# Patient Record
Sex: FEMALE | Race: NATIVE HAWAIIAN OR OTHER PACIFIC ISLANDER
[De-identification: names, ages, dates, MRNs, and addresses within clinical notes are randomized per-mention and may not be internally consistent; named-entity substitution may affect disease eponyms.]

---

## 2018-06-23 ENCOUNTER — HOSPITAL ENCOUNTER (EMERGENCY)
Dept: HOSPITAL 42 - ED | Age: 78
LOS: 1 days | Discharge: HOME | End: 2018-06-24
Payer: MEDICARE

## 2018-06-23 VITALS — TEMPERATURE: 98.3 F

## 2018-06-23 DIAGNOSIS — I10: ICD-10-CM

## 2018-06-23 DIAGNOSIS — S09.90XA: Primary | ICD-10-CM
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
## 2018-06-23 LAB
ALBUMIN SERPL-MCNC: 4.5 G/DL (ref 3–4.8)
ALBUMIN/GLOB SERPL: 1.4 {RATIO} (ref 1.1–1.8)
ALT SERPL-CCNC: 22 U/L (ref 7–56)
APTT BLD: 30.6 SECONDS (ref 25.1–36.5)
AST SERPL-CCNC: 25 U/L (ref 14–36)
BASOPHILS # BLD AUTO: 0.04 K/MM3 (ref 0–2)
BASOPHILS NFR BLD: 0.4 % (ref 0–3)
BUN SERPL-MCNC: 14 MG/DL (ref 7–21)
CALCIUM SERPL-MCNC: 9.2 MG/DL (ref 8.4–10.5)
EOSINOPHIL # BLD: 0.2 10*3/UL (ref 0–0.7)
EOSINOPHIL NFR BLD: 1.9 % (ref 1.5–5)
ERYTHROCYTE [DISTWIDTH] IN BLOOD BY AUTOMATED COUNT: 13.3 % (ref 11.5–14.5)
GFR NON-AFRICAN AMERICAN: > 60
GRANULOCYTES # BLD: 6.47 10*3/UL (ref 1.4–6.5)
GRANULOCYTES NFR BLD: 67 % (ref 50–68)
HGB BLD-MCNC: 13.6 G/DL (ref 12–16)
INR PPP: 0.9 (ref 0.93–1.08)
LYMPHOCYTES # BLD: 2.3 10*3/UL (ref 1.2–3.4)
LYMPHOCYTES NFR BLD AUTO: 23.4 % (ref 22–35)
MCH RBC QN AUTO: 32.5 PG (ref 25–35)
MCHC RBC AUTO-ENTMCNC: 33.8 G/DL (ref 31–37)
MCV RBC AUTO: 95.9 FL (ref 80–105)
MONOCYTES # BLD AUTO: 0.7 10*3/UL (ref 0.1–0.6)
MONOCYTES NFR BLD: 7.3 % (ref 1–6)
PLATELET # BLD: 279 10^3/UL (ref 120–450)
PMV BLD AUTO: 9.8 FL (ref 7–11)
PROTHROMBIN TIME: 10.2 SECONDS (ref 9.4–12.5)
RBC # BLD AUTO: 4.19 10^6/UL (ref 3.5–6.1)
TROPONIN I SERPL-MCNC: < 0.01 NG/ML
WBC # BLD AUTO: 9.7 10^3/UL (ref 4.5–11)

## 2018-06-24 VITALS
OXYGEN SATURATION: 99 % | SYSTOLIC BLOOD PRESSURE: 145 MMHG | DIASTOLIC BLOOD PRESSURE: 70 MMHG | HEART RATE: 72 BPM | RESPIRATION RATE: 18 BRPM

## 2018-06-24 NOTE — CARD
--------------- APPROVED REPORT --------------





EKG Measurement

Heart [id number]

[id number]

[id number]

[id number]

[id number]



<Conclusion>

Normal sinus rhythm

Normal ECG

## 2018-06-24 NOTE — RAD
HISTORY:

syncope  



COMPARISON:

No prior. 



FINDINGS:



LUNGS:

Poor inspiration with low lung volumes, crowded bronchovascular 

markings and mild bibasilar atelectasis



PLEURA:

No significant pleural effusion identified, no pneumothorax apparent.



CARDIOVASCULAR:

.  Cardiomegaly.



OSSEOUS STRUCTURES:

Mild multilevel degenerative spondylosis of the thoracic spine.  

There is a mild levosoliosis dextroscoliosis as well.



VISUALIZED UPPER ABDOMEN:

Normal.



OTHER FINDINGS:

None.



IMPRESSION:

Poor inspiration with low lung volumes, crowded bronchovascular 

markings and mild bibasilar atelectasis

## 2018-06-24 NOTE — ED PDOC
Arrival/HPI





- General


Chief Complaint: Trauma


Time Seen by Provider: 06/23/18 22:11


Historian: Patient





- History of Present Illness


Narrative History of Present Illness (Text): 


06/24/18 00:52


78 year old female, with no significant past medical history, presents to the 

emergency department for evaluation s/p fall. Patient notes she slipped in the 

bath tub and hit her head. Patient's daughter states the patient fell several 

days ago. Patient denies any fever, chills, chest pain, shortness of breath, 

nausea, vomiting, diarrhea, back pain, neck pain, headache, dizziness, or any 

other complaints. 


Time/Duration: Prior to Arrival


Symptom Onset: Sudden


Activities at Onset: Light


Context: Home





Past Medical History





- Provider Review


Nursing Documentation Reviewed: Yes





- Infectious Disease


Hx of Infectious Diseases: None





- Reproductive


Menopause: Yes





- Cardiac


Hx Hypertension: Yes





- Neurological


Hx Dizziness: Yes





- Musculoskeletal/Rheumatological


Hx Arthritis: No





- Psychiatric


Hx Substance Use: No





- Surgical History


Other/Comment: cataract left eye





- Anesthesia


Hx Anesthesia: Yes


Hx Anesthesia Reactions: No


Hx Malignant Hyperthermia: No





Family/Social History





- Physician Review


Nursing Documentation Reviewed: Yes


Family/Social History: Unknown Family HX


Smoking Status: Never Smoked


Hx Alcohol Use: No


Hx Substance Use: No





Allergies/Home Meds


Allergies/Adverse Reactions: 


Allergies





No Known Allergies Allergy (Verified 06/23/18 22:06)


 








Home Medications: 


 Home Meds











 Medication  Instructions  Recorded  Confirmed


 


Amlodipine Besylate/Benazepril 1 each PO DAILY 06/23/18 06/23/18





[Amlodipine-Benazepril 10-20 mg]   


 


Atenolol [Tenormin] 25 mg PO DAILY 06/23/18 06/23/18


 


Atorvastatin [Lipitor] 20 mg PO DAILY 06/23/18 06/23/18














Review of Systems





- Physician Review


All systems were reviewed & negative as marked: Yes





- Review of Systems


Constitutional: Normal


Eyes: Normal


ENT: Normal


Respiratory: Normal.  absent: SOB, Cough


Cardiovascular: Normal.  absent: Chest Pain


Gastrointestinal: Normal.  absent: Abdominal Pain


Genitourinary Female: Normal.  absent: Dysuria, Frequency


Musculoskeletal: Normal.  absent: Back Pain, Neck Pain


Skin: Normal.  absent: Rash


Neurological: Normal.  absent: Headache, Dizziness


Endocrine: Normal


Hemo/Lymphatic: Normal


Psychiatric: Normal





Physical Exam


Vital Signs Reviewed: Yes


Vital Signs











  Temp Pulse Resp BP Pulse Ox


 


 06/24/18 03:10   72  18  145/70  99


 


 06/24/18 01:00   70  18  146/68  98


 


 06/23/18 23:18  98.3 F  68  20  156/65 H  98


 


 06/23/18 21:57  98.7 F  75  19  170/74 H  98











Temperature: Afebrile


Blood Pressure: Normal


Pulse: Regular


Respiratory Rate: Normal


Appearance: Positive for: Well-Appearing, Non-Toxic, Comfortable


Pain Distress: None


Mental Status: Positive for: Alert and Oriented X 3





- Systems Exam


Head: Present: Atraumatic, Normocephalic, Other (Hematoma on scalp on left side 

of head)


Pupils: Present: PERRL


Extroacular Muscles: Present: EOMI


Conjunctiva: Present: Normal


Mouth: Present: Moist Mucous Membranes


Neck: Present: Normal Range of Motion.  No: Meningeal Signs, MIDLINE TENDERNESS

, Paraspinal Tenderness


Respiratory/Chest: Present: Clear to Auscultation, Good Air Exchange.  No: 

Respiratory Distress, Accessory Muscle Use


Cardiovascular: Present: Regular Rate and Rhythm, Normal S1, S2.  No: Murmurs


Abdomen: No: Tenderness, Distention, Peritoneal Signs


Back: Present: Normal Inspection.  No: CVA Tenderness, Midline Tenderness, 

Paraspinal Tenderness


Upper Extremity: Present: Normal Inspection.  No: Cyanosis, Edema


Lower Extremity: Present: Normal Inspection.  No: Edema, CALF TENDERNESS


Neurological: Present: GCS=15, CN II-XII Intact, Speech Normal


Skin: Present: Warm, Dry, Normal Color.  No: Rashes


Psychiatric: Present: Alert, Oriented x 3, Normal Insight, Normal Concentration





Medical Decision Making


ED Course and Treatment: 


06/24/18 00:56


Impression:


78 year old female presents to the emergency department s/p fall.





Plan:





-- CT Head


-- EKG


-- Chest X-ray


-- Reassess and disposition





Progress Notes:








06/24/18 02:52


CT Head reviewed, shows: 


Brain: Periventricular hypoattenuation is likely related to small vessel 

disease. No hemorrhage.


Ventricles: Unremarkable. No ventriculomegaly.


Bones/joints: Unremarkable. No acute fracture.


Soft tissues: Unremarkable.


Sinuses: Unremarkable as visualized. No acute sinusitis.


Mastoid air cells: Unremarkable as visualized. No mastoid effusion.


IMPRESSION:


No acute findings.





- Lab Interpretations


Lab Results: 








 06/23/18 22:50 





 06/23/18 22:50 





 Lab Results





06/23/18 22:50: PT 10.2, INR 0.90 L, APTT 30.6


06/23/18 22:50: WBC 9.7, RBC 4.19, Hgb 13.6, Hct 40.2, MCV 95.9, MCH 32.5, MCHC 

33.8, RDW 13.3, Plt Count 279, MPV 9.8, Gran % 67.0, Lymph % (Auto) 23.4, Mono 

% (Auto) 7.3 H, Eos % (Auto) 1.9, Baso % (Auto) 0.4, Gran # 6.47, Lymph # (Auto

) 2.3, Mono # (Auto) 0.7 H, Eos # (Auto) 0.2, Baso # (Auto) 0.04


06/23/18 22:50: Sodium 145, Potassium 4.2, Chloride 108 H, Carbon Dioxide 22, 

Anion Gap 18, BUN 14, Creatinine 0.7, Est GFR (African Amer) > 60, Est GFR (Non-

Af Amer) > 60, Random Glucose 122 H, Calcium 9.2, Total Bilirubin 0.1 L, AST 25

, ALT 22, Alkaline Phosphatase 64, Troponin I < 0.01, Total Protein 7.8, 

Albumin 4.5, Globulin 3.3, Albumin/Globulin Ratio 1.4











- RAD Interpretation


Radiology Orders: 








06/23/18 22:16


HEAD W/O CONTRAST [CT] Stat 





06/23/18 22:17


CHEST PORTABLE [RAD] Stat 














- EKG Interpretation


EKG Interpretation (Text): 





06/24/18 04:33


nsr rate 67  normal ekg





- Scribe Statement


The provider has reviewed the documentation as recorded by the Scribstacy Castaneda





All medical record entries made by the Scribe were at my direction and 

personally dictated by me. I have reviewed the chart and agree that the record 

accurately reflects my personal performance of the history, physical exam, 

medical decision making, and the department course for this patient. I have 

also personally directed, reviewed, and agree with the discharge instructions 

and disposition.














Disposition/Present on Arrival





- Present on Arrival


History of DVT/PE: No


History of Uncontrolled Diabetes: No


Urinary Catheter: No


History of Decub. Ulcer: No


History Surgical Site Infection Following: None





- Disposition


Diagnosis: 


 Head injury





Disposition: HOME/ ROUTINE


Discharge Instructions (ExitCare):  Closed Head Injury (DC)


Referrals: 


Noah Bartholomew MD [Primary Care Provider] - Follow up with primary


Forms:  [employer] (English)

## 2018-06-24 NOTE — CT
PROCEDURE:  CT HEAD WITHOUT CONTRAST.



HISTORY:

Syncope 



COMPARISON:

No prior study available comparison. 



TECHNIQUE:

Axial computed tomography images were obtained through the head/brain 

without intravenous contrast.  



Radiation dose:



Total exam DLP = 837.59 mGy-cm.



This CT exam was performed using one or more of the following dose 

reduction techniques: Automated exposure control, adjustment of the 

mA and/or kV according to patient size, and/or use of iterative 

reconstruction technique.



FINDINGS:



HEMORRHAGE:

No acute parenchymal, subarachnoid nor extra-axial hemorrhage. 



BRAIN:

Mild moderate chronic periventricular white matter ischemic changes.  

Additionally, multiple more discrete deep and subcortical white 

matter as well as bilateral basal nuclei lacunar type infarcts also 

present.  Note the possibility of a small hyperacute infarct cannot 

be excluded on this exam.



VENTRICLES:

Unremarkable. No hydrocephalus. 



CALVARIUM:

No acute calvarial fractures.



PARANASAL SINUSES:

Unremarkable as visualized. No significant inflammatory changes.



MASTOID AIR CELLS:

Unremarkable as visualized. No inflammatory changes.



OTHER FINDINGS:

Changes left-sided cataract surgery



IMPRESSION:

No acute intracranial hemorrhage.



Mild moderate chronic periventricular white matter ischemic changes.  

Additionally, multiple more discrete deep and subcortical white 

matter as well as bilateral basal nuclei lacunar type infarcts also 

present.  Note the possibility of a small hyperacute infarct cannot 

be excluded on this exam.

## 2018-09-12 ENCOUNTER — NEW PATIENT (OUTPATIENT)
Dept: URBAN - METROPOLITAN AREA CLINIC 73 | Facility: CLINIC | Age: 78
End: 2018-09-12

## 2018-09-12 DIAGNOSIS — H35.373: ICD-10-CM

## 2018-09-12 DIAGNOSIS — H26.492: ICD-10-CM

## 2018-09-12 DIAGNOSIS — H25.89: ICD-10-CM

## 2018-09-12 DIAGNOSIS — H43.813: ICD-10-CM

## 2018-09-12 PROCEDURE — 1036F TOBACCO NON-USER: CPT

## 2018-09-12 PROCEDURE — 92134 CPTRZ OPH DX IMG PST SGM RTA: CPT

## 2018-09-12 PROCEDURE — 4040F PNEUMOC VAC/ADMIN/RCVD: CPT

## 2018-09-12 PROCEDURE — 99204 OFFICE O/P NEW MOD 45 MIN: CPT

## 2018-09-12 PROCEDURE — G9903 PT SCRN TBCO ID AS NON USER: HCPCS

## 2018-09-12 PROCEDURE — 92225 OPHTHALMOSCOPY (INITIAL): CPT

## 2018-09-12 PROCEDURE — G8427 DOCREV CUR MEDS BY ELIG CLIN: HCPCS

## 2018-09-12 ASSESSMENT — VISUAL ACUITY
OS_CC: 20/40+3
OD_CC: 20/40-1
OS_PH: 20/30+1

## 2018-09-12 ASSESSMENT — TONOMETRY
OS_IOP_MMHG: 17
OD_IOP_MMHG: 16

## 2019-08-15 ENCOUNTER — HOSPITAL ENCOUNTER (EMERGENCY)
Facility: HOSPITAL | Age: 79
Discharge: HOME | End: 2019-08-15
Attending: EMERGENCY MEDICINE
Payer: MEDICARE

## 2019-08-15 VITALS
DIASTOLIC BLOOD PRESSURE: 63 MMHG | HEART RATE: 94 BPM | SYSTOLIC BLOOD PRESSURE: 151 MMHG | RESPIRATION RATE: 18 BRPM | OXYGEN SATURATION: 96 % | TEMPERATURE: 97.8 F
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
## 2019-08-15 PROCEDURE — 99282 EMERGENCY DEPT VISIT SF MDM: CPT | Performed by: EMERGENCY MEDICINE

## 2019-08-15 PROCEDURE — 99283 EMERGENCY DEPT VISIT LOW MDM: CPT

## 2019-08-15 RX ORDER — ATORVASTATIN CALCIUM 20 MG/1
20 TABLET, FILM COATED ORAL DAILY
COMMUNITY

## 2019-08-15 NOTE — ED NOTES
Patient discharged but patient waiting on ride from her son in which he lives in [address] [place] [address]  08/15/19 [id number]

## 2019-08-16 NOTE — ED PROVIDER NOTES
History  Chief Complaint   Patient presents with   [facility] Fall     According to the patient, she had tripped, fell and injuried head (no LOC)  Patient is a 61-year-old female who presents after mechanical fall  Patient says approximately a 0 5 hour ago she was at the [address] [place], [place] [address] when she had a mechanical fall  She says that the floor was sticky and she fell forward hitting her forehead  She was able to get up immediately and was ambulatory after the fall  She denies any loss of consciousness, blood thinner use, antiplatelet agents  Patient denies any headache, altered mental status, focal neurological deficit, nausea, vomiting  Patient denies any neck pain or neck stiffness  She denies any chest pain, dyspnea, abdominal pain  She is adamant that it was mechanical fall denies any preceding syncopal symptoms  Prior to Admission Medications   Prescriptions Last Dose Informant Patient Reported? Taking? ATENOLOL PO   Yes Yes   Sig: Take 10 mg by mouth   amLODIPine 10 mg TABS 10 mg, benazepril 10 mg TABS 20 mg   Yes Yes   Sig: Take by mouth daily   atorvastatin (LIPITOR) 20 mg tablet   Yes Yes   Sig: Take 20 mg by mouth daily      Facility-Administered Medications: None       Past Medical History:   Diagnosis Date    Hyperlipidemia     Hypertension        Past Surgical History:   Procedure Laterality Date    EYE SURGERY Left        History reviewed  No pertinent family history  I have reviewed and agree with the history as documented  Social History     Tobacco Use    Smoking status: Never Smoker    Smokeless tobacco: Never Used   Substance Use Topics    Alcohol use: Never     Frequency: Never    Drug use: Never        Review of Systems   Constitutional: Negative for chills, diaphoresis, fatigue and fever  HENT: Negative for facial swelling, sore throat and trouble swallowing  Respiratory: Negative for cough, chest tightness, shortness of breath and wheezing      Cardiovascular: Negative for chest pain  Gastrointestinal: Negative for abdominal distention, abdominal pain, diarrhea, nausea and vomiting  Genitourinary: Negative for dysuria  Musculoskeletal: Negative for back pain, neck pain and neck stiffness  Skin: Negative for color change, pallor, rash and wound  Neurological: Negative for weakness, light-headedness and numbness  Psychiatric/Behavioral: Negative for agitation  All other systems reviewed and are negative  Physical Exam  ED Triage Vitals [08/15/19 1634]   Temperature Pulse Respirations Blood Pressure SpO2   97 8 °F (36 6 °C) 99 18 148/80 98 %      Temp Source Heart Rate Source Patient Position - Orthostatic VS BP Location FiO2 (%)   Oral Monitor Lying Right arm --      Pain Score       --             Orthostatic Vital Signs  Vitals:    08/15/19 1634 08/15/19 1700   BP: 148/80 151/63   Pulse: 99 94   Patient Position - Orthostatic VS: Lying Lying       Physical Exam   Constitutional: She is oriented to person, place, and time  She appears well-developed and well-nourished  No distress  HENT:   Head: Normocephalic  Mild hematoma over the forehead  Eyes: Pupils are equal, round, and reactive to light  Neck: Normal range of motion  Neck supple  Full range of motion no tenderness of the neck   Cardiovascular: Normal rate, regular rhythm, normal heart sounds and intact distal pulses  Pulmonary/Chest: Effort normal and breath sounds normal    Lungs are clear bilaterally   Abdominal: Soft  Bowel sounds are normal  She exhibits no distension  There is no tenderness  There is no guarding  Abdomen is soft, nondistended, nontender  No rebound tenderness or guarding is noted  No masses palpated  Normal bowel sounds  Musculoskeletal: Normal range of motion  She exhibits no edema, tenderness or deformity  Neurological: She is alert and oriented to person, place, and time  No cranial nerve deficit or sensory deficit  Alert oriented x3    Cranial nerves 2-12 intact  Strength 5/5 in all 4 extremities  Sensation intact throughout  Skin: Skin is warm and dry  Capillary refill takes less than 2 seconds  Psychiatric: She has a normal mood and affect  Her behavior is normal  Judgment and thought content normal    Vitals reviewed  ED Medications  Medications - No data to display    Diagnostic Studies  Results Reviewed     None                 No orders to display         Procedures  Procedures        ED Course           Identification of Seniors at Risk      Most Recent Value   (ISAR) Identification of Seniors at Risk   Before the illness or injury that brought you to the Emergency, did you need someone to help you on a regular basis? 0 Filed at: 08/15/2019 1641   In the last 24 hours, have you needed more help than usual?  0 Filed at: 08/15/2019 1641   Have you been hospitalized for one or more nights during the past 6 months? 0 Filed at: 08/15/2019 1641   In general, do you see well?  0 Filed at: 08/15/2019 1641   In general, do you have serious problems with your memory? 0 Filed at: 08/15/2019 1641   Do you take more than three different medications every day? 1 Filed at: 08/15/2019 1641   ISAR Score  1 Filed at: 08/15/2019 1641                          MDM  Number of Diagnoses or Management Options  Fall, initial encounter: new and does not require workup  Diagnosis management comments: Patient is a 78-year-old female who presents after mechanical fall  Patient noted to have mild hematoma over her forehead  She otherwise has no complaints  Neurologically intact  No antiplatelet agents or blood thinners  Patient was ambulatory prior to discharge  Patient was given strict return precautions including worsening headache, altered mental status, nausea, vomiting to return to care         Amount and/or Complexity of Data Reviewed  Clinical lab tests: ordered and reviewed  Tests in the radiology section of CPT®: ordered and reviewed    Risk of Complications, Morbidity, and/or Mortality  Presenting problems: low  Diagnostic procedures: low  Management options: low    Patient Progress  Patient progress: improved      Disposition  Final diagnoses:   Fall, initial encounter     Time reflects when diagnosis was documented in both MDM as applicable and the Disposition within this note     Time User Action Codes Description Comment    8/15/2019  5:10 PM Elvira Escobarey Rich, initial encounter       ED Disposition     ED Disposition Condition Date/Time Comment    Discharge Stable Thu Aug 15, 2019  5:10 PM Chapis Fernando discharge to home/self care  Follow-up Information     Follow up With Specialties Details Why [address] [facility] Emergency Department Emergency Medicine  If he develops headache, altered mental status, numbness, tingling, weakness [address] [place] [address], [address] [place] [address], [id number]          Discharge Medication List as of 8/15/2019  5:11 PM      CONTINUE these medications which have NOT CHANGED    Details   amLODIPine 10 mg TABS 10 mg, benazepril 10 mg TABS 20 mg Take by mouth daily, Historical Med      ATENOLOL PO Take 10 mg by mouth, Historical Med      atorvastatin (LIPITOR) 20 mg tablet Take 20 mg by mouth daily, Historical Med           No discharge procedures on file  ED Provider  Attending physically available and evaluated Chapis Fernando I managed the patient along with the ED Attending      Electronically Signed by         Kelly Rhodes MD  08/16/19 1208

## 2019-08-17 NOTE — ED ATTENDING ATTESTATION
Lui Edge DO saw and evaluated the patient  I have discussed the patient with the resident/non-physician practitioner and agree with the resident's/non-physician practitioner's findings, Plan of Care, and MDM as documented in the resident's/non-physician practitioner's note, except where noted  All available labs and Radiology studies were reviewed  I was present for key portions of any procedure(s) performed by the resident/non-physician practitioner and I was immediately available to provide assistance  At this point I agree with the current assessment done in the Emergency Department  I have conducted an independent evaluation of this patient a history and physical is as follows:    46-year-old female presenting after mechanical fall  Patient was walking at the [facility] when she tripped on a rug falling forward and was braced her fall with her arms and did strike the left side of her forehead  Patient was able to get up immediately without help and was ambulatory after the fall  No loss of conscious  Patient is not anticoagulated  Patient complains a slight discomfort to the left side of her forehead where she has a small hematoma formation  There is no bony deformity  There is no cervical spine tenderness  Nexus criteria is negative  Patient observed in the emergency department and remains clinically stable  Ambulatory in the emergency department upon discharge without difficulty  Follow up if condition worsens          Critical Care Time  Procedures